# Patient Record
Sex: FEMALE | URBAN - NONMETROPOLITAN AREA
[De-identification: names, ages, dates, MRNs, and addresses within clinical notes are randomized per-mention and may not be internally consistent; named-entity substitution may affect disease eponyms.]

---
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
## 2023-01-23 NOTE — PATIENT DISCUSSION
Discussed presence of mild ERM OD. Recommended observation for now. Can consider surgical intervention in the future if the ERM progresses and the patient becomes more symptomatic.
Indications, risks, benefits and alternatives to YAG capsulotomy discussed with patient. Questions answered. Educational handout given.
Instructed to call immediately if any new distortion, blurring, decreased vision or eye pain.
Monitor.
Patient elects to proceed with YAG capsulotomy OD.
Patient understands condition, prognosis and need for follow up care.
RV for POAG testing in 1-2 months VF 24/2, ONH OCT, IOP, PACHY.
Recommended observation.
23-Jan-2023 15:14

## 2024-05-30 NOTE — PATIENT DISCUSSION
Approved 1 refill.  Schedule patient for an appointment.  Must be seen for future refills   My findings and recommendations TODAY are based on the patient's symptoms, exam, diagnostic testing and records.

## 2024-08-18 NOTE — PATIENT DISCUSSION
[facility] MEDICINE PROGRESS NOTE    Patient: Natalie Ramos Today's Date: 8/18/2024   YOB: 1945 Admission Date: 8/16/2024  2:38 AM   MRN: [id number] Inpatient LOS: 1 day(s)   Room: 2405/A Hospital Day:  Hospital Day: 3      Subjective and History     Subjective and overnight events:    Patient seen and examined by me in follow up.  Awake and up to chair  Feeling very tired and fatigued, up all noc with frequent urination    Reported blood in stool but RN did not see    Reviewed pertinent history: Medical History, Surgical History, Social History, Family History    ROS:   Review of Systems   Constitutional:  Positive for activity change, chills and fever. Negative for appetite change.   HENT: Negative.     Eyes: Negative.    Respiratory: Negative.     Cardiovascular: Negative.    Gastrointestinal:  Positive for blood in stool.   Genitourinary: Negative.    Musculoskeletal: Negative.    Skin: Negative.    Neurological:  Positive for weakness.   Psychiatric/Behavioral:  Negative for behavioral problems and confusion.      Pertinent systems negative except as above    Medications: Reviewed       Physical Examination       Vital 24 Hour Range Most Recent Value   Temperature Temp  Min: 97.9 °F (36.6 °C)  Max: 99.1 °F (37.3 °C) 97.9 °F (36.6 °C)   Pulse Pulse  Min: 78  Max: 91 80   Respiratory Resp  Min: 18  Max: 18 18   Blood Pressure BP  Min: 100/64  Max: 118/76 118/76   Pulse Oximetry SpO2  Min: 95 %  Max: 97 % 97 %   Arterial BP No data recorded     O2 No data recorded       Intake and Output:    Intake/Output Summary (Last 24 hours) at 8/18/2024 0705  Last data filed at 8/17/2024 1456  Gross per 24 hour   Intake 780 ml   Output --   Net 780 ml       Last Stool Occurrence:       Vital Most Recent Value First Value   Weight 86.3 kg (190 lb 4.1 oz) Weight: 95.5 kg (210 lb 8.6 oz)   Height 5' 4\" (162.6 cm) Height: 5' 4\" (162.6 cm)   BMI 32.66 N/A     Physical Exam  Vitals and nursing  Recommended observation for now. Can consider surgical intervention in the future if the ERM progresses and the patient becomes more symptomatic OD. note reviewed.   Constitutional:       General: She is not in acute distress.     Appearance: Normal appearance. She is not ill-appearing or diaphoretic.   HENT:      Head: Normocephalic and atraumatic.      Nose: No congestion or rhinorrhea.      Mouth/Throat:      Pharynx: No oropharyngeal exudate or posterior oropharyngeal erythema.      Neck: Neck supple.   Eyes:      General: No scleral icterus.        Right eye: No discharge.         Left eye: No discharge.      Extraocular Movements: Extraocular movements intact.      Conjunctiva/sclera: Conjunctivae normal.   Cardiovascular:      Rate and Rhythm: Normal rate and regular rhythm.      Heart sounds: No murmur heard.     No friction rub. No gallop.   Pulmonary:      Effort: No respiratory distress.      Breath sounds: No stridor. No wheezing, rhonchi or rales.   Abdominal:      General: There is no distension.      Palpations: Abdomen is soft. There is no mass.      Tenderness: There is no abdominal tenderness. There is no guarding or rebound.      Hernia: No hernia is present.   Musculoskeletal:         General: No swelling or tenderness.   Skin:     General: Skin is warm.      Capillary Refill: Capillary refill takes less than 2 seconds.      Coloration: Skin is not jaundiced or pale.      Findings: No bruising, erythema or lesion.   Neurological:      Mental Status: She is alert and oriented to person, place, and time.      Cranial Nerves: No cranial nerve deficit.   Psychiatric:         Thought Content: Thought content normal.         Test Results     Labs:  Lab work has been reviewed and pertinent findings discussed in the Assessment and Plan.      Radiology: Imaging studies have been reviewed and pertinent findings discussed in the Assessment and Plan.      Tubes, Devices, Monitoring     Telemetry: Off      [place]: No    Assessment and Plan     78-year-old female with past medical history of hypertension, hyperlipidemia, diabetes, atrial fibrillation,  asthma and recurrent UTIs admitted to the hospital with:     COVID-19 infection without hypoxia/generalized weakness  - fevers now resolved  - X-ray without infiltrates  - As needed acetaminophen  - No indication for steroids   - Remdesivir x 3 doses  - PT and OT consult    Blood in stool  - per pt report  - check stool guaiac and H&H     Recurrent UTI   - Urinary culture positive for Klebsiella pneumonia 8/11  - abx completed  - c/o ongoing frequency, trial [facility] ER     Atrial fibrillation  - Continue prior to admission rate control strategy      Body mass index is 32.66 kg/m².: Obese (BMI 30-39 without a comorbid condition or 30-34 with a comorbid condition)    DVT Prophylaxis: Xarelto    Smoking status: non smoker      Diet:  Consistent Carb Moderate (45-75 Gm/Meal) Diet    Consults:    None    Therapy orders:  PT/OT    Limited English proficiency with :  No      Advanced Directives     Code Status: Full Resuscitation      Discharge Plan     The patients treatment plans were discussed with patient and RN.     Recommendations for Discharge   SW     PT     OT     SLP       Anticipated discharge destination: TBD      Barriers to Discharge: weakness, IV antivirals      Luke Adler MD   AM Hospitalist  Please contact via secure chat  From 7pm to 7am,  please contact the Hospitalist on call: [phone]
